# Patient Record
(demographics unavailable — no encounter records)

---

## 2025-01-08 NOTE — REVIEW OF SYSTEMS
[Patient Intake Form Reviewed] : Patient intake form was reviewed [Psychiatric Admission] : psychiatric admission [Negative] : Hematologic [MED-ROS-Psych-FT] : stable, most recent 2020

## 2025-01-08 NOTE — PHYSICAL EXAM
[de-identified] : NAD. BMI 30.3 [de-identified] : Normal respiratory effort. [de-identified] : Genital exam performed with a medical chaperone present (DM). There is virilization of the clitoris. The labia minora is small. Urethra appears normal. There are no visible skin lesions.   [de-identified] : Rashel test demonstrates intact palmar arch bilaterally. There is no significant scarring of the forearms. 2+ DP, popliteal, and femoral pulses. Thigh pinch is 2 cm proximally.

## 2025-01-08 NOTE — PHYSICAL EXAM
[de-identified] : NAD. BMI 30.3 [de-identified] : Normal respiratory effort. [de-identified] : Genital exam performed with a medical chaperone present (DM). There is virilization of the clitoris. The labia minora is small. Urethra appears normal. There are no visible skin lesions.   [de-identified] : Rashel test demonstrates intact palmar arch bilaterally. There is no significant scarring of the forearms. 2+ DP, popliteal, and femoral pulses. Thigh pinch is 2 cm proximally.

## 2025-01-08 NOTE — ASSESSMENT
[FreeTextEntry1] : The patient is interested in preceding with phalloplasty and urethral lengthening. The patient expresses preference for the left forearm use for donor site. He also expresses preference for scrotoplasty. He is eventually interested in erectile prosthesis. We reviewed the significant limitations of erectile prostheses.  Advised patient to begin hair removal, he will find his own location. Patient will also research fertility preservation. He will also find a provider near him to perform hysterectomy. We recommend he return for further discussion in about 6 months.  I, Dr. Carey, personally performed the evaluation and management (E/M) services for this new patient. That E/M includes conducting the clinically appropriate initial history &/or exam, assessing all conditions, and establishing the plan of care. Today, my MAJOR, Arsenio Mitchell PA-C, was here to observe my evaluation and management service for this patient & follow plan of care established by me going forward.

## 2025-01-08 NOTE — HISTORY OF PRESENT ILLNESS
[FreeTextEntry1] : 26yo man designated female at birth (Augusto; he/him) presents for consultation for bottom surgery. He is specifically interested in phalloplasty with urethral lengthening. His goals are to be able to stand to urinate and to engage in penetrative intercourse with a partner (both vaginally and anal). Discussed with him the difficulty and limitations for penetrative anal intercourse vs vaginal intercourse after phalloplasty. He expresses preference for a left forearm donor site as he is right handed. He has been on testosterone since 2021. He has not previously undergone hysterectomy. He underwent bilateral mastectomy in 7/2022 in [place]. He has never been pregnant before. He denies any history of blood clots or bleeding disorders. He denies any issues with general anesthesia. He is considering fertility options and is aware that he would not be able to pursue this after surgery.  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 Lives alone but has supportive coworkers and friends who are willing to assist him after the procedure. Endorses marijuana use. The patient agreed to avoid all marijuana smoking for 6 weeks before surgery and 6 weeks after surgery and to avoid all THC use for 2 weeks before and after surgery. Denies tobacco, alcohol, and any other recreational drug use. Patient denies any history of psychiatric hospitalization or ER admission in the past two years, most recently 7/2022.

## 2025-01-08 NOTE — HISTORY OF PRESENT ILLNESS
[FreeTextEntry1] : 28yo man designated female at birth (Augusto; he/him) presents for consultation for bottom surgery. He is specifically interested in phalloplasty with urethral lengthening. His goals are to be able to stand to urinate and to engage in penetrative intercourse with a partner (both vaginally and anal). Discussed with him the difficulty and limitations for penetrative anal intercourse vs vaginal intercourse after phalloplasty. He expresses preference for a left forearm donor site as he is right handed. He has been on testosterone since 2021. He has not previously undergone hysterectomy. He underwent bilateral mastectomy in 7/2022 in [place]. He has never been pregnant before. He denies any history of blood clots or bleeding disorders. He denies any issues with general anesthesia. He is considering fertility options and is aware that he would not be able to pursue this after surgery.  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 Lives alone but has supportive coworkers and friends who are willing to assist him after the procedure. Endorses marijuana use. The patient agreed to avoid all marijuana smoking for 6 weeks before surgery and 6 weeks after surgery and to avoid all THC use for 2 weeks before and after surgery. Denies tobacco, alcohol, and any other recreational drug use. Patient denies any history of psychiatric hospitalization or ER admission in the past two years, most recently 7/2022.